# Patient Record
(demographics unavailable — no encounter records)

---

## 2025-02-10 NOTE — PHYSICAL EXAM
[Chaperone Present] : A chaperone was present in the examining room during all aspects of the physical examination [[id number]] : A chaperone was present during the pelvic exam. [Labia Majora] : were normal [Normal Appearance] : general appearance was normal [Atrophy] : atrophy [Aa ____] : Aa [unfilled] [Ba ____] : Ba [unfilled] [C ____] : C [unfilled] [GH ____] : GH [unfilled] [PB ____] : PB [unfilled] [TVL ____] : TVL  [unfilled] [Ap ____] : Ap [unfilled] [Bp ____] : Bp [unfilled] [D ____] : D [unfilled] [Normal] : normal [Normal rectal exam] : was normal [FreeTextEntry1] : General: Well, appearing. Alert and orientated. No acute distress HEENT: Normocephalic, atraumatic and extraocular muscles appear to be intact  Neck: Full range of motion, no obvious lymphadenopathy, deformities, or masses noted  Respiratory: Speaking in full sentences comfortably, normal work of breathing and no cough during visit Musculoskeletal: active full range of motion in extremities  Extremities: No upper extremity edema noted Skin: no obvious rash or skin lesions Neuro: Orientated X 3, speech is fluent, normal rate Psych: Normal mood and affect    [Tenderness] : ~T no ~M abdominal tenderness observed [Distended] : not distended

## 2025-02-10 NOTE — HISTORY OF PRESENT ILLNESS
[Unable To Restrain Bowel Movement] : moderate [Feelings Of Urinary Urgency] : moderate [x3+] : nocturia three or more  times a night [] : yes [Uses ___ pads per day] : uses [unfilled] pad(s) per day [Urinary Tract Infection] : mild [Stool Visible Blood] : mild [Rectal Prolapse] : severe [de-identified] : sexually active [FreeTextEntry1] :  PMH: none PSH: laparoscopic RSO (benign ovarian cyst) in 2012, thyroidectomy   daily fluid intake: seltzer, water, 2 c tea

## 2025-02-10 NOTE — DISCUSSION/SUMMARY
[FreeTextEntry1] : We reviewed management options for her prolapse including: observation, pelvic floor exercises with and without PT, pessary, and surgical management. We reviewed various surgical management options including vaginal, laparoscopic/robotic, abdominal procedures. We reviewed procedures involving hysterectomy as well as uterine sparing procedures. We also reviewed both mesh and non-mesh options. . Written IUGA information on prolapse treatment options was also provided to her to review. She is interested in surgery with a JUAN CARLOS PERALES however would like to discuss with her family and consider options further. She would like to proceed with preop workup including URD and pelvic sonogram. Rx provided. She will RTO for URD and follow up with me to discuss results and management options further.

## 2025-04-14 NOTE — HISTORY OF PRESENT ILLNESS
[FreeTextEntry1] :  Katie presents for follow up and discussion of urodynamic test results. She has a h/o complete uterovaginal prolapse. She underwent urodynamics which revealed Stress urinary incontinence. We discussed that although her test was negative for detrusor overactivity, she may still have an overactive bladder. We reviewed results of her PFS, PVR <100cc. Findings and symptoms reviewed. She did not undergo pelvic sonogram   PMH: none PSH: laparoscopic RSO (benign ovarian cyst) in 2012, thyroidectomy

## 2025-04-14 NOTE — DISCUSSION/SUMMARY
[FreeTextEntry1] : We reviewed management options for her prolapse including: observation, pelvic floor exercises with and without PT, pessary, and surgical management. We reviewed various surgical management options including vaginal, laparoscopic/robotic, abdominal procedures. We reviewed procedures involving hysterectomy as well as uterine sparing procedures. We also reviewed both mesh and non-mesh options.  Written IUGA information on prolapse treatment options was also provided to her to review. She remains interested in surgery with a JUAN CARLOS PERALES.    We reviewed management options for stress urinary incontinence including: observation, pelvic floor exercises, continence devices, periurethral bulking agents, and surgical management. We discussed surgical management options and written information on stress urinary incontinence including management options from IUGA was provided to her and reviewed. We reviewed risks and benefits of each procedure. She desires a sling at the time of her prolapse surgery.  Will proceed with surgical scheduling and she will RTO for preop counseling. She will obtain pelvic sonogram, Rx provided at last visit. She will call to schedule.

## 2025-06-04 NOTE — PHYSICAL EXAM
[MA] : MA [Labia Majora] : were normal [Labia Minora] : were normal [Normal Appearance] : general appearance was normal [Normal] : normal [Uterine Adnexae] : were not tender and not enlarged [FreeTextEntry2] : [place] [FreeTextEntry1] : General: Well appearing. Alert and oriented. No acute distress. HEENT: Normocephalic, atraumatic, extraocular muscles appear to be intact. Neck: Full range of motion, no obvious lymphadenopathy, deformities, or masses noted. Respiratory: Speaking in full sentences comfortably. Normal work of breathing. No cough during visit. Musculoskeletal: Active full range of motion in extremities. Skin: No obvious rash or skin lesions. Neuro: Oriented x3. Speech is fluent, normal rate. Psych: Normal mood and affect. [FreeTextEntry3] : atrophied, small opening but 14F catheter passed easily [de-identified] :  Aa 3, Ba 7, C 8, GH 3.5, PB 1.5, TVL 9, Ap 3, Bp 7 and D 4.  two 1 cm erosions on vagina near cervix, not actively bleeding

## 2025-06-04 NOTE — HISTORY OF PRESENT ILLNESS
[FreeTextEntry1] : 66 yo with stage IV POP, occult OPAL, OAB here for pre-surgical counseling.  Patient denies any new complaints.  She continues to desire surgical management ().  Her pre-operative workup is as follows:   UDS (2025): intermediate 300 mL, +-300 mL, no DO, PVR 72 cc. Pelvic US (2025): Uterus: 4.5 cm x 2.4 cm x 3.9 cm. Endometrium: 1 mm. Bilateral adnexa not visualized.  Pap (2023): NILM, HR HPV neg [x] UCx collected today [ ] Medical clearance   PMH: PFO PSH: laparoscopic R oophorectomy, unsure about tube (benign ovarian cyst) in , thyroidectomy OBGYN:  x 1; denies abnormal paps; minimal PMB related to prolapse erosions Allergies: ?steroids Rx: multi-vitamin, Vit D, B12, zinc Fam: denies coagulopathies or gyn cancers Soc: never smoker; retired

## 2025-06-04 NOTE — HISTORY OF PRESENT ILLNESS
[FreeTextEntry1] : 66 yo with stage IV POP, occult OPAL, OAB here for pre-surgical counseling.  Patient denies any new complaints.  She continues to desire surgical management ().  Her pre-operative workup is as follows:   UDS (2025): senior care 300 mL, +-300 mL, no DO, PVR 72 cc. Pelvic US (2025): Uterus: 4.5 cm x 2.4 cm x 3.9 cm. Endometrium: 1 mm. Bilateral adnexa not visualized.  Pap (2023): NILM, HR HPV neg [x] UCx collected today [ ] Medical clearance   PMH: PFO PSH: laparoscopic R oophorectomy, unsure about tube (benign ovarian cyst) in , thyroidectomy OBGYN:  x 1; denies abnormal paps; minimal PMB related to prolapse erosions Allergies: ?steroids Rx: multi-vitamin, Vit D, B12, zinc Fam: denies coagulopathies or gyn cancers Soc: never smoker; retired

## 2025-06-04 NOTE — PHYSICAL EXAM
[MA] : MA [Labia Majora] : were normal [Labia Minora] : were normal [Normal Appearance] : general appearance was normal [Normal] : normal [Uterine Adnexae] : were not tender and not enlarged [FreeTextEntry2] : [place] [FreeTextEntry1] : General: Well appearing. Alert and oriented. No acute distress. HEENT: Normocephalic, atraumatic, extraocular muscles appear to be intact. Neck: Full range of motion, no obvious lymphadenopathy, deformities, or masses noted. Respiratory: Speaking in full sentences comfortably. Normal work of breathing. No cough during visit. Musculoskeletal: Active full range of motion in extremities. Skin: No obvious rash or skin lesions. Neuro: Oriented x3. Speech is fluent, normal rate. Psych: Normal mood and affect. [FreeTextEntry3] : atrophied, small opening but 14F catheter passed easily [de-identified] :  Aa 3, Ba 7, C 8, GH 3.5, PB 1.5, TVL 9, Ap 3, Bp 7 and D 4.  two 1 cm erosions on vagina near cervix, not actively bleeding

## 2025-06-04 NOTE — HISTORY OF PRESENT ILLNESS
[FreeTextEntry1] : 66 yo with stage IV POP, occult OPAL, OAB here for pre-surgical counseling.  Patient denies any new complaints.  She continues to desire surgical management ().  Her pre-operative workup is as follows:   UDS (2025): care home 300 mL, +-300 mL, no DO, PVR 72 cc. Pelvic US (2025): Uterus: 4.5 cm x 2.4 cm x 3.9 cm. Endometrium: 1 mm. Bilateral adnexa not visualized.  Pap (2023): NILM, HR HPV neg [x] UCx collected today [ ] Medical clearance   PMH: PFO PSH: laparoscopic R oophorectomy, unsure about tube (benign ovarian cyst) in , thyroidectomy OBGYN:  x 1; denies abnormal paps; minimal PMB related to prolapse erosions Allergies: ?steroids Rx: multi-vitamin, Vit D, B12, zinc Fam: denies coagulopathies or gyn cancers Soc: never smoker; retired

## 2025-06-04 NOTE — PHYSICAL EXAM
[MA] : MA [Labia Majora] : were normal [Labia Minora] : were normal [Normal Appearance] : general appearance was normal [Normal] : normal [Uterine Adnexae] : were not tender and not enlarged [FreeTextEntry2] : [place] [FreeTextEntry1] : General: Well appearing. Alert and oriented. No acute distress. HEENT: Normocephalic, atraumatic, extraocular muscles appear to be intact. Neck: Full range of motion, no obvious lymphadenopathy, deformities, or masses noted. Respiratory: Speaking in full sentences comfortably. Normal work of breathing. No cough during visit. Musculoskeletal: Active full range of motion in extremities. Skin: No obvious rash or skin lesions. Neuro: Oriented x3. Speech is fluent, normal rate. Psych: Normal mood and affect. [FreeTextEntry3] : atrophied, small opening but 14F catheter passed easily [de-identified] :  Aa 3, Ba 7, C 8, GH 3.5, PB 1.5, TVL 9, Ap 3, Bp 7 and D 4.  two 1 cm erosions on vagina near cervix, not actively bleeding

## 2025-06-04 NOTE — DISCUSSION/SUMMARY
[FreeTextEntry1] : 66 yo presents with Stage IV prolapse and occult stress urinary incontinence. She continues to be interested in robotic supracervical hysterectomy and sacrocolpopexy. We discussed her UDS findings of OPAL and reviewed treatment options. She desires a midurethral sling at the time of her prolapse repair.    She denies a history of abnormal Pap smears in the past, and a recent Pap smear is negative. Risks and benefits of total hysterectomy vs. supracervical hysterectomy were discussed with patient, and she opted for supracervical hysterectomy. She is aware that her cervix will be left in situ. We discussed the risks and benefits of removing her ovaries at the time of surgery, and she would like to keep her ovaries in situ at this time. We will therefore plan to proceed with a bilateral salpingectomy only at the time of the procedure.   We reviewed risks of the surgery including but not limited to: bleeding, infection, pain, urinary retention requiring the long term use of catheters, failure to reduce prolapse, prolapse recurrence, persistence or worsening of stress urinary incontinence, development of urge incontinence, voiding dysfunction, dyspareunia, bowel obstruction, venous thromboembolism (VTE), fistula formation, neuropathy, reoperation, and *mesh erosion. We discussed the risk of possible conversion to open surgery via exploratory laparotomy. We discussed the risk of injury to her bladder, ureters, urethra, vagina, rectum and bowel. We discussed the possibility of going home with a catheter. The risks and procedure details were explained in layman's terms and she expressed understanding of all of these risks.  We provided a preoperative handout detailing the surgery name, risks, and recovery instructions. We discussed the elective nature of the surgery and we discussed that she is choosing to undergo this surgery despite awareness and understanding of procedure risks. We reviewed her hospital stay as well as preoperative and postoperative instructions.   Patient signed consent for: pelvic exam under anesthesia, robotic/laparoscopic-assisted supracervical hysterectomy, bilateral salpingectomy, sacrocolpopexy, midurethral sling with mesh, cystoscopy; possible anterior/posterior repair, oophorectomy, laparotomy. A mesh specific consent was signed. Pt understands that pelvic exam under anesthesia can be performed by learners (medical students/residents/fellows).    90 minutes spent in counseling with patient and . They both verbalized understanding. All questions answered.

## 2025-06-04 NOTE — DISCUSSION/SUMMARY
[FreeTextEntry1] : 68 yo presents with Stage IV prolapse and occult stress urinary incontinence. She continues to be interested in robotic supracervical hysterectomy and sacrocolpopexy. We discussed her UDS findings of OPAL and reviewed treatment options. She desires a midurethral sling at the time of her prolapse repair.    She denies a history of abnormal Pap smears in the past, and a recent Pap smear is negative. Risks and benefits of total hysterectomy vs. supracervical hysterectomy were discussed with patient, and she opted for supracervical hysterectomy. She is aware that her cervix will be left in situ. We discussed the risks and benefits of removing her ovaries at the time of surgery, and she would like to keep her ovaries in situ at this time. We will therefore plan to proceed with a bilateral salpingectomy only at the time of the procedure.   We reviewed risks of the surgery including but not limited to: bleeding, infection, pain, urinary retention requiring the long term use of catheters, failure to reduce prolapse, prolapse recurrence, persistence or worsening of stress urinary incontinence, development of urge incontinence, voiding dysfunction, dyspareunia, bowel obstruction, venous thromboembolism (VTE), fistula formation, neuropathy, reoperation, and *mesh erosion. We discussed the risk of possible conversion to open surgery via exploratory laparotomy. We discussed the risk of injury to her bladder, ureters, urethra, vagina, rectum and bowel. We discussed the possibility of going home with a catheter. The risks and procedure details were explained in layman's terms and she expressed understanding of all of these risks.  We provided a preoperative handout detailing the surgery name, risks, and recovery instructions. We discussed the elective nature of the surgery and we discussed that she is choosing to undergo this surgery despite awareness and understanding of procedure risks. We reviewed her hospital stay as well as preoperative and postoperative instructions.   Patient signed consent for: pelvic exam under anesthesia, robotic/laparoscopic-assisted supracervical hysterectomy, bilateral salpingectomy, sacrocolpopexy, midurethral sling with mesh, cystoscopy; possible anterior/posterior repair, oophorectomy, laparotomy. A mesh specific consent was signed. Pt understands that pelvic exam under anesthesia can be performed by learners (medical students/residents/fellows).    90 minutes spent in counseling with patient and . They both verbalized understanding. All questions answered.

## 2025-07-02 NOTE — OBJECTIVE
[Post Void Residual ____ ml] : Post Void Residual was [unfilled] ml [Soft and Nontender] : soft and nontender [Clean, Dry, Intact] : Clean, Dry, Intact [Good Support] : Good support [Healing well] : healing well [No Masses or Tenderness] : no masses or tenderness [FreeTextEntry1] : non-distended [FreeTextEntry3] : no mesh palpated or visualized

## 2025-07-02 NOTE — SUBJECTIVE
[FreeTextEntry1] : Reports feels well overall [FreeTextEntry7] : well controlled with motrin and tylenol [FreeTextEntry6] : tolerating full diet without n/v [FreeTextEntry5] : no sx of OPAL, incomplete emptying [FreeTextEntry4] : regular without constipation [FreeTextEntry3] : without issue

## 2025-07-02 NOTE — DISCUSSION/SUMMARY
[FreeTextEntry1] : Katie is a 68 yo s/p RA-TLH, LS, SCP, mMUS, cysto 6/17/25. She is doing well. Postoperative restrictions, instructions, and bleeding precautions reviewed. She was counseled to call if any questions and RTO in 4 weeks or sooner, should issues arise. Patient verbalized understanding.  All questions answered.

## 2025-07-28 NOTE — DISCUSSION/SUMMARY
[FreeTextEntry1] : Katie is a 66 yo s/p RA-TLH, LS, SCP, mMUS, cysto 6/17/25. She is doing well. All post-op milestones were met. She was advised that she may follow-up as neeeded